# Patient Record
Sex: FEMALE | Race: WHITE | Employment: UNEMPLOYED | URBAN - METROPOLITAN AREA
[De-identification: names, ages, dates, MRNs, and addresses within clinical notes are randomized per-mention and may not be internally consistent; named-entity substitution may affect disease eponyms.]

---

## 2017-07-19 ENCOUNTER — HOSPITAL ENCOUNTER (OUTPATIENT)
Age: 52
Setting detail: SPECIMEN
Discharge: HOME | End: 2017-07-19
Payer: COMMERCIAL

## 2017-07-19 LAB
ABSOLUTE EOS #: 0.1 K/UL (ref 0–0.4)
ABSOLUTE LYMPH #: 1.1 K/UL (ref 1–4.8)
ABSOLUTE MONO #: 0.4 K/UL (ref 0.1–1.2)
ALBUMIN SERPL-MCNC: 4.2 G/DL (ref 3.5–5.2)
ALBUMIN/GLOBULIN RATIO: 2.1 (ref 1–2.5)
ALP BLD-CCNC: 41 U/L (ref 35–104)
ALT SERPL-CCNC: 17 U/L (ref 5–33)
ANION GAP SERPL CALCULATED.3IONS-SCNC: 15 MMOL/L (ref 9–17)
AST SERPL-CCNC: 22 U/L
BASOPHILS # BLD: 0 %
BASOPHILS ABSOLUTE: 0 K/UL (ref 0–0.2)
BILIRUB SERPL-MCNC: 0.37 MG/DL (ref 0.3–1.2)
BUN BLDV-MCNC: 6 MG/DL (ref 6–20)
BUN/CREAT BLD: ABNORMAL (ref 9–20)
CALCIUM SERPL-MCNC: 9.2 MG/DL (ref 8.6–10.4)
CHLORIDE BLD-SCNC: 97 MMOL/L (ref 98–107)
CO2: 26 MMOL/L (ref 20–31)
CREAT SERPL-MCNC: 0.58 MG/DL (ref 0.5–0.9)
DIFFERENTIAL TYPE: ABNORMAL
EOSINOPHILS RELATIVE PERCENT: 3 %
GFR AFRICAN AMERICAN: >60 ML/MIN
GFR NON-AFRICAN AMERICAN: >60 ML/MIN
GFR SERPL CREATININE-BSD FRML MDRD: ABNORMAL ML/MIN/{1.73_M2}
GFR SERPL CREATININE-BSD FRML MDRD: ABNORMAL ML/MIN/{1.73_M2}
GLUCOSE BLD-MCNC: 89 MG/DL (ref 70–99)
HCT VFR BLD CALC: 35.3 % (ref 36–46)
HEMOGLOBIN: 12 G/DL (ref 12–16)
LYMPHOCYTES # BLD: 22 %
MCH RBC QN AUTO: 32.8 PG (ref 26–34)
MCHC RBC AUTO-ENTMCNC: 34 G/DL (ref 31–37)
MCV RBC AUTO: 96.3 FL (ref 80–100)
MONOCYTES # BLD: 8 %
PDW BLD-RTO: 16.1 % (ref 12.5–15.4)
PLATELET # BLD: 213 K/UL (ref 140–450)
PLATELET ESTIMATE: ABNORMAL
PMV BLD AUTO: 7.9 FL (ref 6–12)
POTASSIUM SERPL-SCNC: 4.5 MMOL/L (ref 3.7–5.3)
RBC # BLD: 3.67 M/UL (ref 4–5.2)
RBC # BLD: ABNORMAL 10*6/UL
SEG NEUTROPHILS: 67 %
SEGMENTED NEUTROPHILS ABSOLUTE COUNT: 3.4 K/UL (ref 1.8–7.7)
SODIUM BLD-SCNC: 138 MMOL/L (ref 135–144)
TOTAL PROTEIN: 6.2 G/DL (ref 6.4–8.3)
WBC # BLD: 5 K/UL (ref 3.5–11)
WBC # BLD: ABNORMAL 10*3/UL

## 2017-07-21 ENCOUNTER — HOSPITAL ENCOUNTER (OUTPATIENT)
Age: 52
Setting detail: SPECIMEN
Discharge: HOME | End: 2017-07-21
Payer: COMMERCIAL

## 2017-07-21 LAB
ALBUMIN SERPL-MCNC: 4.4 G/DL (ref 3.5–5.2)
ALBUMIN/GLOBULIN RATIO: 2 (ref 1–2.5)
ALP BLD-CCNC: 42 U/L (ref 35–104)
ALT SERPL-CCNC: 14 U/L (ref 5–33)
ANION GAP SERPL CALCULATED.3IONS-SCNC: 14 MMOL/L (ref 9–17)
AST SERPL-CCNC: 20 U/L
BILIRUB SERPL-MCNC: 0.39 MG/DL (ref 0.3–1.2)
BUN BLDV-MCNC: 9 MG/DL (ref 6–20)
BUN/CREAT BLD: ABNORMAL (ref 9–20)
CALCIUM SERPL-MCNC: 9.3 MG/DL (ref 8.6–10.4)
CHLORIDE BLD-SCNC: 95 MMOL/L (ref 98–107)
CO2: 28 MMOL/L (ref 20–31)
CREAT SERPL-MCNC: 0.58 MG/DL (ref 0.5–0.9)
GFR AFRICAN AMERICAN: >60 ML/MIN
GFR NON-AFRICAN AMERICAN: >60 ML/MIN
GFR SERPL CREATININE-BSD FRML MDRD: ABNORMAL ML/MIN/{1.73_M2}
GFR SERPL CREATININE-BSD FRML MDRD: ABNORMAL ML/MIN/{1.73_M2}
GLUCOSE BLD-MCNC: 89 MG/DL (ref 70–99)
POTASSIUM SERPL-SCNC: 4.3 MMOL/L (ref 3.7–5.3)
SODIUM BLD-SCNC: 137 MMOL/L (ref 135–144)
TOTAL PROTEIN: 6.6 G/DL (ref 6.4–8.3)

## 2017-07-28 ENCOUNTER — HOSPITAL ENCOUNTER (OUTPATIENT)
Age: 52
Setting detail: SPECIMEN
Discharge: HOME | End: 2017-07-28
Payer: COMMERCIAL

## 2017-07-28 LAB
ALBUMIN SERPL-MCNC: 4.1 G/DL (ref 3.5–5.2)
ALBUMIN/GLOBULIN RATIO: 2.3 (ref 1–2.5)
ALP BLD-CCNC: 34 U/L (ref 35–104)
ALT SERPL-CCNC: 15 U/L (ref 5–33)
ANION GAP SERPL CALCULATED.3IONS-SCNC: 16 MMOL/L (ref 9–17)
AST SERPL-CCNC: 20 U/L
BILIRUB SERPL-MCNC: 0.23 MG/DL (ref 0.3–1.2)
BUN BLDV-MCNC: 12 MG/DL (ref 6–20)
BUN/CREAT BLD: ABNORMAL (ref 9–20)
CALCIUM SERPL-MCNC: 9 MG/DL (ref 8.6–10.4)
CHLORIDE BLD-SCNC: 97 MMOL/L (ref 98–107)
CO2: 26 MMOL/L (ref 20–31)
CREAT SERPL-MCNC: 0.52 MG/DL (ref 0.5–0.9)
GFR AFRICAN AMERICAN: >60 ML/MIN
GFR NON-AFRICAN AMERICAN: >60 ML/MIN
GFR SERPL CREATININE-BSD FRML MDRD: ABNORMAL ML/MIN/{1.73_M2}
GFR SERPL CREATININE-BSD FRML MDRD: ABNORMAL ML/MIN/{1.73_M2}
GLUCOSE BLD-MCNC: 93 MG/DL (ref 70–99)
POTASSIUM SERPL-SCNC: 4.3 MMOL/L (ref 3.7–5.3)
SODIUM BLD-SCNC: 139 MMOL/L (ref 135–144)
TOTAL PROTEIN: 5.9 G/DL (ref 6.4–8.3)

## 2017-09-13 ENCOUNTER — HOSPITAL ENCOUNTER (OUTPATIENT)
Age: 52
Setting detail: SPECIMEN
Discharge: HOME | End: 2017-09-13
Payer: COMMERCIAL

## 2017-09-13 LAB
ANION GAP SERPL CALCULATED.3IONS-SCNC: 20 MMOL/L (ref 9–17)
BUN BLDV-MCNC: 10 MG/DL (ref 6–20)
BUN/CREAT BLD: ABNORMAL (ref 9–20)
CALCIUM SERPL-MCNC: 9.3 MG/DL (ref 8.6–10.4)
CHLORIDE BLD-SCNC: 98 MMOL/L (ref 98–107)
CO2: 22 MMOL/L (ref 20–31)
CREAT SERPL-MCNC: 0.51 MG/DL (ref 0.5–0.9)
GFR AFRICAN AMERICAN: >60 ML/MIN
GFR NON-AFRICAN AMERICAN: >60 ML/MIN
GFR SERPL CREATININE-BSD FRML MDRD: ABNORMAL ML/MIN/{1.73_M2}
GFR SERPL CREATININE-BSD FRML MDRD: ABNORMAL ML/MIN/{1.73_M2}
GLUCOSE BLD-MCNC: 100 MG/DL (ref 70–99)
HCT VFR BLD CALC: 30.7 % (ref 36–46)
HEMOGLOBIN: 11 G/DL (ref 12–16)
MCH RBC QN AUTO: 33.4 PG (ref 26–34)
MCHC RBC AUTO-ENTMCNC: 35.7 G/DL (ref 31–37)
MCV RBC AUTO: 93.4 FL (ref 80–100)
PDW BLD-RTO: 13.3 % (ref 11.5–14.5)
PLATELET # BLD: 128 K/UL (ref 130–400)
PMV BLD AUTO: ABNORMAL FL (ref 6–12)
POTASSIUM SERPL-SCNC: 4.4 MMOL/L (ref 3.7–5.3)
RBC # BLD: 3.29 M/UL (ref 4–5.2)
SODIUM BLD-SCNC: 140 MMOL/L (ref 135–144)
T4 TOTAL: 8.6 UG/DL (ref 4.5–12)
TSH SERPL DL<=0.05 MIU/L-ACNC: 2.27 MIU/L (ref 0.3–5)
URIC ACID: 6.8 MG/DL (ref 2.4–5.7)
VITAMIN D 25-HYDROXY: 19.6 NG/ML (ref 30–100)
WBC # BLD: 3.8 K/UL (ref 3.5–11)

## 2017-09-13 PROCEDURE — 84550 ASSAY OF BLOOD/URIC ACID: CPT

## 2017-09-13 PROCEDURE — 84443 ASSAY THYROID STIM HORMONE: CPT

## 2017-09-13 PROCEDURE — 80048 BASIC METABOLIC PNL TOTAL CA: CPT

## 2017-09-13 PROCEDURE — 36415 COLL VENOUS BLD VENIPUNCTURE: CPT

## 2017-09-13 PROCEDURE — 85027 COMPLETE CBC AUTOMATED: CPT

## 2017-09-13 PROCEDURE — P9603 ONE-WAY ALLOW PRORATED MILES: HCPCS

## 2017-09-13 PROCEDURE — 82306 VITAMIN D 25 HYDROXY: CPT

## 2017-09-13 PROCEDURE — 84436 ASSAY OF TOTAL THYROXINE: CPT

## 2018-08-02 ENCOUNTER — HOSPITAL ENCOUNTER (EMERGENCY)
Age: 53
Discharge: HOME | End: 2018-08-02
Attending: SPECIALIST
Payer: COMMERCIAL

## 2018-08-02 VITALS
OXYGEN SATURATION: 97 % | BODY MASS INDEX: 24.84 KG/M2 | RESPIRATION RATE: 14 BRPM | DIASTOLIC BLOOD PRESSURE: 71 MMHG | TEMPERATURE: 98.2 F | HEART RATE: 92 BPM | WEIGHT: 135 LBS | HEIGHT: 62 IN | SYSTOLIC BLOOD PRESSURE: 139 MMHG

## 2018-08-02 DIAGNOSIS — L03.113 CELLULITIS OF RIGHT UPPER EXTREMITY: Primary | ICD-10-CM

## 2018-08-02 PROCEDURE — 99282 EMERGENCY DEPT VISIT SF MDM: CPT

## 2018-08-02 RX ORDER — PANTOPRAZOLE SODIUM 40 MG/1
40 TABLET, DELAYED RELEASE ORAL
COMMUNITY
End: 2020-05-13

## 2018-08-02 RX ORDER — SODIUM CHLORIDE 1000 MG
1 TABLET, SOLUBLE MISCELLANEOUS
COMMUNITY

## 2018-08-02 RX ORDER — DOCUSATE SODIUM 100 MG/1
100 CAPSULE, LIQUID FILLED ORAL
COMMUNITY
End: 2020-05-13

## 2018-08-02 RX ORDER — MULTIVIT-MIN/IRON/FOLIC ACID/K 18-600-40
2000 CAPSULE ORAL
COMMUNITY

## 2018-08-02 RX ORDER — LEVETIRACETAM 750 MG/1
1500 TABLET ORAL
COMMUNITY

## 2018-08-02 RX ORDER — DOXYCYCLINE HYCLATE 100 MG
100 TABLET ORAL 2 TIMES DAILY
Qty: 20 TABLET | Refills: 0 | Status: SHIPPED | OUTPATIENT
Start: 2018-08-02 | End: 2018-08-12

## 2018-08-02 RX ORDER — GABAPENTIN 300 MG/1
300 CAPSULE ORAL 2 TIMES DAILY
COMMUNITY

## 2018-08-02 RX ORDER — LISINOPRIL 5 MG/1
5 TABLET ORAL
COMMUNITY
End: 2020-05-13

## 2018-08-02 RX ORDER — ASPIRIN 325 MG
TABLET ORAL
COMMUNITY
Start: 2018-04-10 | End: 2020-05-13

## 2018-08-02 RX ORDER — ACETAMINOPHEN 325 MG/1
650 TABLET ORAL
COMMUNITY

## 2018-08-02 RX ORDER — PREDNISONE 20 MG/1
20 TABLET ORAL DAILY
COMMUNITY

## 2018-08-02 RX ORDER — FUROSEMIDE 40 MG/1
40 TABLET ORAL
COMMUNITY

## 2018-08-02 ASSESSMENT — ENCOUNTER SYMPTOMS
COLOR CHANGE: 1
SHORTNESS OF BREATH: 0
NAUSEA: 0
VOMITING: 0
TROUBLE SWALLOWING: 0
COUGH: 0

## 2018-08-02 NOTE — ED PROVIDER NOTES
[address] [facility] ED  eMERGENCY dEPARTMENT eNCOUnter      Pt Name: Uziel Mcneil  MRN: [id number]  [place] 1965  Date of evaluation: 8/2/2018  Provider: ROJELIO Amaral [address] [facility]       Chief Complaint   Patient presents with    Wound Infection     right arm         HISTORY OF PRESENT ILLNESS  (Location/Symptom, Timing/Onset, Context/Setting, Quality, Duration, Modifying Factors, Severity.)   Uziel Mcneil is a 45 y.o. female who presents to the emergency department complaints of insect bites to right upper arm. Patient states she noticed 2 red bumps to right upper arm 4 days ago. States now she has increased redness and swelling to the area with 2 blisters in the center. States it is not very painful but very itchy. History believes that redness and swelling is getting worse and was concerned that insect bites were infected. No fever, chills, nausea or vomiting. Nursing Notes were reviewed and I agree. REVIEW OF SYSTEMS    (2-9 systems for level 4, 10 or more for level 5)     Review of Systems   Constitutional: Negative for chills and fever. HENT: Negative for trouble swallowing. Respiratory: Negative for cough and shortness of breath. Cardiovascular: Negative for chest pain and palpitations. Gastrointestinal: Negative for nausea and vomiting. Skin: Positive for color change and rash. Except as noted above the remainder of the review of systems was reviewed and negative. PAST MEDICAL HISTORY         Diagnosis Date    [facility] cancer ([facility]     Cancer [facility])     Cerebral artery occlusion with cerebral infarction ([address]     Diabetes mellitus ([address]     Hypertension     Ovarian cancer ([address]     Seizures ([address]      Reviewed. SURGICAL HISTORY           Procedure Laterality Date    BRAIN SURGERY      HYSTERECTOMY       Reviewed.   CURRENT MEDICATIONS       Previous Medications    ACETAMINOPHEN (TYLENOL) 325 MG TABLET    Take 650 mg by mouth    ASPIRIN respiratory distress. Musculoskeletal: Normal range of motion. She exhibits no edema. Neurological: She is alert and oriented to person, place, and time. Coordination normal.   Skin: Skin is warm and dry. She is not diaphoretic. Area of erythema and mild swelling to right upper arm, consistent with cellulitis. 2 small fluid filled blisters in the center of erythema. No red streaking. No purulent drainage. Blanchable. Pruritic. Psychiatric: She has a normal mood and affect. Her behavior is normal.       DIAGNOSTIC RESULTS     RADIOLOGY:   none      LABS:  Labs Reviewed - No data to display    All other labs were within normal range or not returned as of this dictation. EMERGENCY DEPARTMENT COURSE and DIFFERENTIAL DIAGNOSIS/MDM:   Patient presents to ED with complaints of right upper arm redness and swelling after insect bite. Concern for infection, we'll start on antibiotics to treat for cellulitis. Continue Benadryl as needed for itching. Okay to discharge home. Follow-up with family doctor or clinic of choice in 1 or 2 days for a recheck. Return to ED if any worsening or new symptoms. Vitals:    Vitals:    08/02/18 1827   BP: 139/71   Pulse: 92   Resp: 14   Temp: 98.2 °F (36.8 °C)   TempSrc: Oral   SpO2: 97%   Weight: 61.2 kg (135 lb)   Height: 5' 2\" (1.575 m)         Vitals reviewed. PROCEDURES:  None    FINAL IMPRESSION      1. Cellulitis of right upper extremity          DISPOSITION/PLAN   DISPOSITION Decision To Discharge 08/02/2018 06:47:37 PM      PATIENT REFERRED TO:  ROJELIO Burris CNP  [address] [address]  [phone]    Schedule an appointment as soon as possible for a visit   Follow up visit    [place] [address] ED  [address] [facility] [address]  [phone]    If symptoms worsen      DISCHARGE MEDICATIONS:  New Prescriptions    DOXYCYCLINE HYCLATE (VIBRA-TABS) 100 MG TABLET    Take 1 tablet by mouth 2 times daily for 10 days       (Please note that portions of this note were completed with a voice recognition program.  Efforts were made to edit the dictations but occasionally words are mis-transcribed.)    ROJELIO Lynn - Debbie [address], ROJELIO - SAM  08/02/18 6286

## 2018-08-03 NOTE — ED PROVIDER NOTES
auscultation. Rhythm is regular without murmurs. Abdomen is soft and nontender with active bowel sounds. Patient has erythema, edema and tenderness in the right upper arm on the outer aspect suggestive of early cellulitis. There is no obvious fluctuance or abscess at this time. Patient was started on doxycycline twice daily for 10 days and she is advised to follow up with PCP, return if worse.        Segundo Guzman MD  08/02/18 8117

## 2019-10-18 ENCOUNTER — HOSPITAL ENCOUNTER (OUTPATIENT)
Age: 54
Setting detail: SPECIMEN
Discharge: HOME | End: 2019-10-18
Payer: COMMERCIAL

## 2019-10-18 LAB
ABSOLUTE EOS #: 0.17 K/UL (ref 0–0.4)
ABSOLUTE IMMATURE GRANULOCYTE: 0 K/UL (ref 0–0.3)
ABSOLUTE LYMPH #: 0.52 K/UL (ref 1–4.8)
ABSOLUTE MONO #: 0.22 K/UL (ref 0.2–0.8)
ANION GAP SERPL CALCULATED.3IONS-SCNC: 13 MMOL/L (ref 9–17)
BASOPHILS # BLD: 0 %
BASOPHILS ABSOLUTE: 0 K/UL (ref 0–0.2)
BUN BLDV-MCNC: 14 MG/DL (ref 6–20)
BUN/CREAT BLD: ABNORMAL (ref 9–20)
CALCIUM SERPL-MCNC: 8.6 MG/DL (ref 8.6–10.4)
CHLORIDE BLD-SCNC: 102 MMOL/L (ref 98–107)
CO2: 23 MMOL/L (ref 20–31)
CREAT SERPL-MCNC: 0.9 MG/DL (ref 0.5–0.9)
DIFFERENTIAL TYPE: ABNORMAL
EOSINOPHILS RELATIVE PERCENT: 4 % (ref 1–4)
GFR AFRICAN AMERICAN: >60 ML/MIN
GFR NON-AFRICAN AMERICAN: >60 ML/MIN
GFR SERPL CREATININE-BSD FRML MDRD: ABNORMAL ML/MIN/{1.73_M2}
GFR SERPL CREATININE-BSD FRML MDRD: ABNORMAL ML/MIN/{1.73_M2}
GLUCOSE BLD-MCNC: 116 MG/DL (ref 70–99)
HCT VFR BLD CALC: 25.8 % (ref 36.3–47.1)
HEMOGLOBIN: 8.3 G/DL (ref 11.9–15.1)
IMMATURE GRANULOCYTES: 0 %
KEPPRA: 69 UG/ML
LYMPHOCYTES # BLD: 12 % (ref 24–44)
MCH RBC QN AUTO: 32.4 PG (ref 25.2–33.5)
MCHC RBC AUTO-ENTMCNC: 32.2 G/DL (ref 28.4–34.8)
MCV RBC AUTO: 100.8 FL (ref 82.6–102.9)
MONOCYTES # BLD: 5 % (ref 1–7)
NRBC AUTOMATED: 0 PER 100 WBC
PDW BLD-RTO: 15.8 % (ref 11.8–14.4)
PLATELET # BLD: 170 K/UL (ref 138–453)
PLATELET ESTIMATE: ABNORMAL
PMV BLD AUTO: 10.4 FL (ref 8.1–13.5)
POTASSIUM SERPL-SCNC: 4.3 MMOL/L (ref 3.7–5.3)
RBC # BLD: 2.56 M/UL (ref 3.95–5.11)
RBC # BLD: ABNORMAL 10*6/UL
SEG NEUTROPHILS: 79 % (ref 36–66)
SEGMENTED NEUTROPHILS ABSOLUTE COUNT: 3.39 K/UL (ref 1.8–7.7)
SODIUM BLD-SCNC: 138 MMOL/L (ref 135–144)
WBC # BLD: 4.3 K/UL (ref 3.5–11.3)
WBC # BLD: ABNORMAL 10*3/UL

## 2019-10-18 PROCEDURE — P9603 ONE-WAY ALLOW PRORATED MILES: HCPCS

## 2019-10-18 PROCEDURE — 36415 COLL VENOUS BLD VENIPUNCTURE: CPT

## 2019-10-18 PROCEDURE — 80048 BASIC METABOLIC PNL TOTAL CA: CPT

## 2019-10-18 PROCEDURE — 80177 DRUG SCRN QUAN LEVETIRACETAM: CPT

## 2019-10-18 PROCEDURE — 85025 COMPLETE CBC W/AUTO DIFF WBC: CPT

## 2020-05-13 ENCOUNTER — HOSPITAL ENCOUNTER (OUTPATIENT)
Dept: RADIATION ONCOLOGY | Facility: MEDICAL CENTER | Age: 55
Discharge: HOME | End: 2020-05-13
Payer: COMMERCIAL

## 2020-05-13 VITALS
RESPIRATION RATE: 14 BRPM | SYSTOLIC BLOOD PRESSURE: 119 MMHG | DIASTOLIC BLOOD PRESSURE: 66 MMHG | TEMPERATURE: 97.7 F | HEART RATE: 73 BPM | OXYGEN SATURATION: 99 %

## 2020-05-13 PROCEDURE — 99213 OFFICE O/P EST LOW 20 MIN: CPT | Performed by: RADIOLOGY

## 2020-05-13 PROCEDURE — 99205 OFFICE O/P NEW HI 60 MIN: CPT | Performed by: RADIOLOGY

## 2020-05-13 RX ORDER — MEROPENEM AND SODIUM CHLORIDE 1 G/50ML
1 INJECTION, SOLUTION INTRAVENOUS DAILY
COMMUNITY

## 2020-05-13 RX ORDER — UREA 10 %
1 LOTION (ML) TOPICAL DAILY
COMMUNITY

## 2020-05-13 RX ORDER — ALLOPURINOL 100 MG/1
100 TABLET ORAL DAILY
COMMUNITY
Start: 2019-07-05

## 2020-05-13 RX ORDER — ONDANSETRON 4 MG/1
4 TABLET, ORALLY DISINTEGRATING ORAL EVERY 8 HOURS PRN
COMMUNITY
Start: 2018-08-07

## 2020-05-13 RX ORDER — UBIDECARENONE 75 MG
1000 CAPSULE ORAL DAILY
COMMUNITY
Start: 2018-06-14

## 2020-05-13 ASSESSMENT — PAIN DESCRIPTION - FREQUENCY: FREQUENCY: CONTINUOUS

## 2020-05-13 ASSESSMENT — PAIN DESCRIPTION - PAIN TYPE: TYPE: CHRONIC PAIN

## 2020-05-13 ASSESSMENT — PAIN DESCRIPTION - DESCRIPTORS: DESCRIPTORS: ACHING

## 2020-05-13 ASSESSMENT — PAIN DESCRIPTION - LOCATION: LOCATION: GENERALIZED

## 2020-05-13 ASSESSMENT — PAIN SCALES - GENERAL: PAINLEVEL_OUTOF10: 3

## 2020-05-13 NOTE — PROGRESS NOTES
[address] [facility] [place] On[facility]  NEW PATIENT CONSULTATION    Date of Service: 2020  Location: [facility]    Patient ID:   Miki Gunn  : 1965   MRN: [id number]    Diagnosis: 62 y.o. woman diagnosed in  with anaplastic mixed oligoastrocytoma, oligodendroglioma predominant, WHO Grade III, status post (partial) resection in Oct 2011, followed by adjuvant radiotherapy (concluded in 2012) and temozolomide, developed recurrent disease in 2017 status post resection and adjuvant procarbazine and lomustine, and more recently with radiographic progression of disease. Chief Complaint: \"My brain cancer is back. \"    Dear Dr. Murphy Schmitt,    Thank you for requesting a Radiation Oncology consultation on your patient, Miki Gunn. As you know, Ms. Chiquis Dobbins is a 47-year-old woman who was diagnosed in  with high-grade glioma, as above. She underwent oncologic surgery (partial resection, as per the patient spouse) at [facility] in [place], followed by adjuvant radiotherapy and concurrent temozolomide. She developed recurrent disease in  and underwent resection at [facility], followed by adjuvant procarbazine and lomustine. She has been following up regularly with you, Dr. Yuniel Don in Neurooncology at [facility]. Most recent brain MRI from  revealed interval two enhancing masses in the surgical cavity, 5 and 7 mm, highly suspicious for recurrent disease. Patient was initially recommended to start bevacizumab based therapy, however you decided against this given her history of bleeds. Instead, patient was started on prednisone, and more recently temozolomide, and is tolerating it well. Symptomatically, patient's performance status is relatively low. She is dependent on her  for nearly all activities of daily living, except for feeding. She has right hemiparesis, as expected given her surgeries.   The last time she walked independently was >2 years normal, muscle power in left samuel-body 5-/5, right hemiparesis. PSYCH: Affect is appropriate for clinical situation. Insight and judgment do not appear impaired. LABS: 5/13/2020 CBC revealed anemia with hemoglobin 9.9, hematocrit 29.8, elevated , elevated RDW 56, thrombocytopenia 123. RADS: 4/8/2020 MRI of the brain with and without contrast personally reviewed. It showed interval development of two enhancing lesions in the left lateral aspect of the cavitary lesion at the left frontal lobe convexity. Largest lesion is present more superiorly and anteriorly with heterogenous signal and enhancement, measuring 7 mm. The second lesion measures 5 mm, and is more inferior and lateral along the cortex. These are worrisome for tumor recurrence versus metastatic lesion. The focal right frontal lobe dural thickening of 5 mm with intense enhancement throughout the right cerebral meninges are grossly stable, highly concerning for underlying leptomeningeal disease, possibly neoplastic. There is minimal left frontal dural thickening and enhancement. PATHOLOGY: 4/14/2020 CSF, cytospin preparation, showed rare small to intermediate sized lymphocytes present in the background of scant rare red blood cells and monocytes. 5/16/2017 MGMT promoter methylation test on 2017 recurrent tumor specimen, indeterminate result. 3/22/2017 brain, left frontal, resection showed recurrent/residual astrocytoma, IDH mutant, previously WHO grade III. \"Although the current material does not share high-grade features from the original tumor in 2011, typically recurrent brain tumors are not downgraded. \"    October 16, 2015 [facility] review of 2011 left frontal lobe resection specimen, anaplastic astrocytoma, WHO grade III. The tumor is marked by moderate hypercellularity and scattered mitotic figures. Areas of the tumor have an oligodendroglioma appearance. Approximately 30% of the tumor cells demonstrate p53 meter reactivity. arrangements regarding the referral.    Thank you for involving me in the care of Ms. Vasquez. Please call me with any questions regarding her care. Carson Degroot MD, [facility] Radiation Oncology  [phone] (cell)    I spent 60 minutes with the patient. >50% of the time was allotted to education, answering questions, and coordinating care. CC:  Patient Care Team:  ROJELIO Artis - CNP as PCP - General (Nurse Practitioner)  Stefany Goins MD as Consulting Physician (Hematology and Oncology)  Carson Degroot MD as Consulting Physician (Radiation Oncology)  Laron Simpson MD (Internal Medicine)  Ike Lopez MD as Consulting Physician     N.B. This note is created with the assistance of a speech recognition program.  While intending to generate a document that actually reflects the content of the visit, the document can still contain errors including those of syntax and sound-alike substitutions that may escape proof reading. In such instances, actual meaning can be extrapolated by contextual diversion.

## 2020-05-13 NOTE — PROGRESS NOTES
patient condition which places them at a risk for a fall   4-6 Moderate Risk 1. Provide assistance as indicated for ambulation activities  2. Reorient confused/cognitively impaired patient  3. Call-light/bell within patient's reach  4. Chair/bed in low position, stretcher/bed with siderails up except when performing patient care activities  5. Educate patient/family/caregiver on falls prevention     7 or   Higher High Risk 1. Place patient in easily observable treatment room  2. Patient attended at all times by family member or staff  3. Provide assistance as indicated for ambulation activities  4. Reorient confused/cognitively impaired patient  5. Call-light/bell within patient's reach  6. Chair/bed in low position, stretcher/bed with siderails up except when performing patient care activities  7. Educate patient/family/caregiver on falls prevention             Assessment/Plan: Patient was seen today for consultation. She reports weakness and generalized pain which was rated a 3/10. Pt denies needing any medication to help control this pain. Dr Kenyon Mcintosh notified and examined pt. PT will be referred to [facility] for a second opinion. Pt to f/u with RO in July 2020. Multiple attempts made to call [address] [phone] to obtain treatment planning records. Multiple times writer was placed on hold for greater than 10 min with no one returning to line.           Tammy Cifuentes 5/13/2020 1:39 PM

## 2020-05-14 ENCOUNTER — TELEPHONE (OUTPATIENT)
Dept: RADIATION ONCOLOGY | Facility: MEDICAL CENTER | Age: 55
End: 2020-05-14

## 2020-05-14 NOTE — TELEPHONE ENCOUNTER
File room called me back and they state they can not send images from other hospitals to [facility]. [facility] said [facility] will have to call them the get images there self. [facility] will [facility] disc to [facility] in the mail. Address given for them to mail.

## 2023-12-04 NOTE — TELEPHONE ENCOUNTER
I faxed complete chart and referral orders to [address] [place] [address], Dr Eirca Mc in Radiation Oncology [phone]. Phone [phone]. RN LEXI Barnard